# Patient Record
Sex: FEMALE | URBAN - METROPOLITAN AREA
[De-identification: names, ages, dates, MRNs, and addresses within clinical notes are randomized per-mention and may not be internally consistent; named-entity substitution may affect disease eponyms.]

---

## 2019-05-01 ENCOUNTER — TRANSCRIBE ORDERS (OUTPATIENT)
Dept: MATERNAL FETAL MEDICINE | Facility: CLINIC | Age: 29
End: 2019-05-01

## 2019-05-01 ENCOUNTER — PRE VISIT (OUTPATIENT)
Dept: MATERNAL FETAL MEDICINE | Facility: CLINIC | Age: 29
End: 2019-05-01

## 2019-05-01 DIAGNOSIS — O26.90 PREGNANCY RELATED CONDITION, ANTEPARTUM: Primary | ICD-10-CM

## 2019-05-03 ENCOUNTER — HOSPITAL ENCOUNTER (OUTPATIENT)
Dept: ULTRASOUND IMAGING | Facility: CLINIC | Age: 29
Discharge: HOME | End: 2019-05-03
Attending: OBSTETRICS & GYNECOLOGY | Admitting: OBSTETRICS & GYNECOLOGY
Payer: MEDICAID

## 2019-05-03 ENCOUNTER — TELEPHONE (OUTPATIENT)
Dept: MATERNAL FETAL MEDICINE | Facility: CLINIC | Age: 29
End: 2019-05-03

## 2019-05-03 ENCOUNTER — OFFICE VISIT (OUTPATIENT)
Dept: MATERNAL FETAL MEDICINE | Facility: CLINIC | Age: 29
End: 2019-05-03
Attending: OBSTETRICS & GYNECOLOGY
Payer: MEDICAID

## 2019-05-03 DIAGNOSIS — O35.9XX0 SUSPECTED FETAL ANOMALY, ANTEPARTUM, SINGLE OR UNSPECIFIED FETUS: ICD-10-CM

## 2019-05-03 DIAGNOSIS — O26.90 PREGNANCY RELATED CONDITION, ANTEPARTUM: ICD-10-CM

## 2019-05-03 DIAGNOSIS — O99.212 OBESITY AFFECTING PREGNANCY IN SECOND TRIMESTER: Primary | ICD-10-CM

## 2019-05-03 PROCEDURE — 76811 OB US DETAILED SNGL FETUS: CPT

## 2019-05-03 NOTE — PROGRESS NOTES
"Please see \"Imaging\" tab under \"Chart Review\" for details of today's visit.    Park Slaughter    "

## 2019-05-03 NOTE — TELEPHONE ENCOUNTER
"MARIA FERNANDA received a referral from provider, Jayson Argueta, at  [facility].     Writer called, [phone], and was transferred to Medical Dept. regarding patients last name, \"REYESHERNANDEZ\". Spoke to Letty, and she was not able to verify patient's correct last name as they were not able to obtain an ID from patient's last visit. They will ask for an ID when patient comes in for her next appt.     Patient stated that her correct last name should be \"Reyes Perales\", and not \"Reyesheranadez\".    Referring clinic notified.    Kathi Estrada,    , [facility]    "

## 2019-05-03 NOTE — NURSING NOTE
present for check-in only, due to pt being 30+ minutes late. Pt declined IPad for scan.   : Pili Riojas  ID # [id number]  [place] [employer]

## 2019-05-31 ENCOUNTER — OFFICE VISIT (OUTPATIENT)
Dept: MATERNAL FETAL MEDICINE | Facility: CLINIC | Age: 29
End: 2019-05-31
Attending: OBSTETRICS & GYNECOLOGY
Payer: MEDICAID

## 2019-05-31 ENCOUNTER — HOSPITAL ENCOUNTER (OUTPATIENT)
Dept: ULTRASOUND IMAGING | Facility: CLINIC | Age: 29
Discharge: HOME | End: 2019-05-31
Attending: OBSTETRICS & GYNECOLOGY | Admitting: OBSTETRICS & GYNECOLOGY
Payer: MEDICAID

## 2019-05-31 DIAGNOSIS — O99.212 OBESITY AFFECTING PREGNANCY IN SECOND TRIMESTER: Primary | ICD-10-CM

## 2019-05-31 DIAGNOSIS — O99.212 MATERNAL OBESITY SYNDROME, ANTEPARTUM, SECOND TRIMESTER: ICD-10-CM

## 2019-05-31 DIAGNOSIS — O35.9XX0 SUSPECTED FETAL ANOMALY, ANTEPARTUM, SINGLE OR UNSPECIFIED FETUS: ICD-10-CM

## 2019-05-31 PROCEDURE — 76816 OB US FOLLOW-UP PER FETUS: CPT

## 2019-05-31 NOTE — PROGRESS NOTES
Please see full imaging report from [employer] program under imaging tab.    Chon Puente MD  Maternal Fetal Medicine

## 2019-06-28 ENCOUNTER — OFFICE VISIT (OUTPATIENT)
Dept: MATERNAL FETAL MEDICINE | Facility: CLINIC | Age: 29
End: 2019-06-28
Attending: OBSTETRICS & GYNECOLOGY
Payer: MEDICAID

## 2019-06-28 ENCOUNTER — HOSPITAL ENCOUNTER (OUTPATIENT)
Dept: ULTRASOUND IMAGING | Facility: CLINIC | Age: 29
Discharge: HOME | End: 2019-06-28
Attending: OBSTETRICS & GYNECOLOGY | Admitting: OBSTETRICS & GYNECOLOGY
Payer: MEDICAID

## 2019-06-28 DIAGNOSIS — O99.212 OBESITY AFFECTING PREGNANCY IN SECOND TRIMESTER: ICD-10-CM

## 2019-06-28 DIAGNOSIS — O99.212 OBESITY AFFECTING PREGNANCY IN SECOND TRIMESTER: Primary | ICD-10-CM

## 2019-06-28 PROCEDURE — 76816 OB US FOLLOW-UP PER FETUS: CPT

## 2019-06-28 NOTE — PROGRESS NOTES
Please refer to ultrasound report under 'Imaging' Studies of 'Chart Review' tabs.    Jose Pizarro M.D.

## 2019-06-28 NOTE — NURSING NOTE
present for [facility] appt- Zoey Canseco, [facility] [place] [id number].  
Standing/Walking/Toileting

## 2019-08-08 ENCOUNTER — HOSPITAL ENCOUNTER (OUTPATIENT)
Dept: ULTRASOUND IMAGING | Facility: CLINIC | Age: 29
Discharge: HOME | End: 2019-08-08
Attending: OBSTETRICS & GYNECOLOGY | Admitting: OBSTETRICS & GYNECOLOGY
Payer: MEDICAID

## 2019-08-08 ENCOUNTER — OFFICE VISIT (OUTPATIENT)
Dept: MATERNAL FETAL MEDICINE | Facility: CLINIC | Age: 29
End: 2019-08-08
Attending: OBSTETRICS & GYNECOLOGY
Payer: MEDICAID

## 2019-08-08 DIAGNOSIS — O99.212 OBESITY AFFECTING PREGNANCY IN SECOND TRIMESTER: ICD-10-CM

## 2019-08-08 DIAGNOSIS — O99.212 MATERNAL OBESITY SYNDROME, ANTEPARTUM, SECOND TRIMESTER: Primary | ICD-10-CM

## 2019-08-08 PROCEDURE — 76816 OB US FOLLOW-UP PER FETUS: CPT
